# Patient Record
Sex: FEMALE | Race: WHITE | Employment: OTHER | URBAN - METROPOLITAN AREA
[De-identification: names, ages, dates, MRNs, and addresses within clinical notes are randomized per-mention and may not be internally consistent; named-entity substitution may affect disease eponyms.]

---

## 2017-07-31 ENCOUNTER — OFFICE VISIT (OUTPATIENT)
Dept: NEUROLOGY | Age: 82
End: 2017-07-31

## 2017-07-31 VITALS
BODY MASS INDEX: 21.46 KG/M2 | HEART RATE: 53 BPM | RESPIRATION RATE: 18 BRPM | OXYGEN SATURATION: 96 % | SYSTOLIC BLOOD PRESSURE: 130 MMHG | HEIGHT: 62 IN | WEIGHT: 116.6 LBS | DIASTOLIC BLOOD PRESSURE: 84 MMHG

## 2017-07-31 DIAGNOSIS — R27.0 ATAXIA: Primary | ICD-10-CM

## 2017-07-31 RX ORDER — LORAZEPAM 1 MG/1
TABLET ORAL
Refills: 2 | COMMUNITY
Start: 2017-06-01

## 2017-07-31 RX ORDER — CYCLOSPORINE 0.5 MG/ML
1 EMULSION OPHTHALMIC 2 TIMES DAILY
COMMUNITY

## 2017-07-31 RX ORDER — HYDROCHLOROTHIAZIDE 25 MG/1
TABLET ORAL
Refills: 2 | COMMUNITY
Start: 2017-05-17

## 2017-07-31 RX ORDER — OXYBUTYNIN CHLORIDE 10 MG/1
TABLET, EXTENDED RELEASE ORAL
Refills: 3 | COMMUNITY
Start: 2017-07-25

## 2017-07-31 RX ORDER — VENLAFAXINE HYDROCHLORIDE 75 MG/1
CAPSULE, EXTENDED RELEASE ORAL
Refills: 2 | COMMUNITY
Start: 2017-07-10

## 2017-07-31 NOTE — MR AVS SNAPSHOT
Visit Information Date & Time Provider Department Dept. Phone Encounter #  
 7/31/2017 11:00 AM Sheba Kendall MD [facility] at [address] [id number] Follow-up Instructions Return in about 4 weeks (around 8/28/2017). Upcoming Health Maintenance Date Due DTaP/Tdap/Td series (1 - Tdap) 9/9/1955 ZOSTER VACCINE AGE 60> 7/9/1994 GLAUCOMA SCREENING Q2Y 9/9/1999 OSTEOPOROSIS SCREENING (DEXA) 9/9/1999 Pneumococcal 65+ Low/Medium Risk (1 of 2 - PCV13) 9/9/1999 MEDICARE YEARLY EXAM 9/9/1999 INFLUENZA AGE 9 TO ADULT 8/1/2017 Allergies as of 7/31/2017  Review Complete On: 7/31/2017 By: Sheba Kendall MD  
  
 Severity Noted Reaction Type Reactions Erythromycin  07/31/2017    Other (comments) Stomach cramps Current Immunizations  Never Reviewed No immunizations on file. Not reviewed this visit You Were Diagnosed With   
  
 Codes Comments Ataxia    -  Primary ICD-10-CM: R27.0 ICD-9-CM: 919. 3 Vitals BP Pulse Resp Height(growth percentile) Weight(growth percentile) SpO2  
 130/84 (!) 53 18 5' 2\" (1.575 m) 116 lb 9.6 oz (52.9 kg) 96% BMI Smoking Status 21.33 kg/m2 Former Smoker Vitals History BMI and BSA Data Body Mass Index Body Surface Area  
 21.33 kg/m 2 1.52 m 2 Your Updated Medication List  
  
   
This list is accurate as of: 7/31/17 12:09 PM.  Always use your most recent med list.  
  
  
  
  
 hydroCHLOROthiazide 25 mg tablet Commonly known as:  HYDRODIURIL TK 1 T PO D  
  
 LORazepam 1 mg tablet Commonly known as:  ATIVAN  
TK 2 TS PO QHS  
  
 oxybutynin chloride XL 10 mg CR tablet Commonly known as:  DITROPAN XL  
TK 1 T PO QD  
  
 RESTASIS 0.05 % ophthalmic emulsion Generic drug:  cycloSPORINE Administer 1 Drop to both eyes two (2) times a day. venlafaxine-SR 75 mg capsule Commonly known as:  EFFEXOR-XR TK 1 C PO D  
  
  
 We Performed the Following CBC WITH AUTOMATED DIFF [97710 CPT(R)] METABOLIC PANEL, COMPREHENSIVE [72052 CPT(R)] METHYLMALONIC ACID [63147 CPT(R)] REFERRAL TO PHYSICAL THERAPY [JLT50 Custom] Comments:  
 Please evaluate and tx patient for ataxic gait with h/o falls TSH 3RD GENERATION [10973 CPT(R)] VITAMIN B12 [id number] CPT(R)] VITAMIN E [id number] CPT(R)] Follow-up Instructions Return in about 4 weeks (around 8/28/2017). To-Do List   
 08/07/2017 Imaging:  MRI BRAIN WO CONT Referral Information Referral ID Referred By Referred To  
  
 [id number] Nguyen Moody Not Available Visits Status Start Date End Date 1 New Request 7/31/17 7/31/18 If your referral has a status of pending review or denied, additional information will be sent to support the outcome of this decision. Patient Instructions PRESCRIPTION REFILL POLICY [facility] Statement to Patients April 1, 2014 In an effort to ensure the large volume of patient prescription refills is processed in the most efficient and expeditious manner, we are asking our patients to assist us by calling your Pharmacy for all prescription refills, this will include also your  Mail Order Pharmacy. The pharmacy will contact our office electronically to continue the refill process. Please do not wait until the last minute to call your pharmacy. We need at least 48 hours (2days) to fill prescriptions. We also encourage you to call your pharmacy before going to  your prescription to make sure it is ready. With regard to controlled substance prescription refill requests (narcotic refills) that need to be picked up at our office, we ask your cooperation by providing us with at least 72 hours (3days) notice that you will need a refill.  
 
We will not refill narcotic prescription refill requests after 4:00pm on any weekday, Monday through Thursday, or after 2:00pm on Fridays, or on the weekends. We encourage everyone to explore another way of getting your prescription refill request processed using [employer], our patient web portal through our electronic medical record system. [employer] is an efficient and effective way to communicate your medication request directly to the office and  downloadable as an stephan on your smart phone . [employer] also features a review functionality that allows you to view your medication list as well as leave messages for your physician. Are you ready to get connected? If so please review the attatched instructions or speak to any of our staff to get you set up right away! Thank you so much for your cooperation. Should you have any questions please contact our Practice Administrator. The Physicians and Staff,  [facility] Please bring all medication bottles, including vitamins, supplements and any over-the-counter medications, to your next office visit. Introducing [facility]! [facility] introduces [employer] patient portal. Now you can access parts of your medical record, email your doctor's office, and request medication refills online. 1. In your internet browser, go to [email or web address] 2. Click on the First Time User? Click Here link in the Sign In box. You will see the New Member Sign Up page. 3. Enter your [employer] Access Code exactly as it appears below. You will not need to use this code after youve completed the sign-up process. If you do not sign up before the expiration date, you must request a new code. · [employer] Access Code: [email or web address] Expires: 10/29/2017 10:26 AM 
 
4. Enter the last four digits of your Social Security Number (xxxx) and Date of Birth (mm/dd/yyyy) as indicated and click Submit. You will be taken to the next sign-up page. 5. Create a [employer] ID. This will be your [employer] login ID and cannot be changed, so think of one that is secure and easy to remember. 6. Create a [employer] password. You can change your password at any time. 7. Enter your Password Reset Question and Answer. This can be used at a later time if you forget your password. 8. Enter your e-mail address. You will receive e-mail notification when new information is available in [address] 9. Click Sign Up. You can now view and download portions of your medical record. 10. Click the Download Summary menu link to download a portable copy of your medical information. If you have questions, please visit the Frequently Asked Questions section of the [employer] website. Remember, [employer] is NOT to be used for urgent needs. For medical emergencies, dial 911. Now available from your iPhone and Android! Please provide this summary of care documentation to your next provider. Your primary care clinician is listed as Henning Sergio. If you have any questions after today's visit, please call [phone].

## 2017-07-31 NOTE — PATIENT INSTRUCTIONS
[facility]   Statement to Patients  April 1, 2014      In an effort to ensure the large volume of patient prescription refills is processed in the most efficient and expeditious manner, we are asking our patients to assist us by calling your Pharmacy for all prescription refills, this will include also your  Mail Order Pharmacy. The pharmacy will contact our office electronically to continue the refill process. Please do not wait until the last minute to call your pharmacy. We need at least 48 hours (2days) to fill prescriptions. We also encourage you to call your pharmacy before going to  your prescription to make sure it is ready. With regard to controlled substance prescription refill requests (narcotic refills) that need to be picked up at our office, we ask your cooperation by providing us with at least 72 hours (3days) notice that you will need a refill. We will not refill narcotic prescription refill requests after 4:00pm on any weekday, Monday through Thursday, or after 2:00pm on Fridays, or on the weekends. We encourage everyone to explore another way of getting your prescription refill request processed using [employer], our patient web portal through our electronic medical record system. [employer] is an efficient and effective way to communicate your medication request directly to the office and  downloadable as an stephan on your smart phone . [employer] also features a review functionality that allows you to view your medication list as well as leave messages for your physician. Are you ready to get connected? If so please review the attatched instructions or speak to any of our staff to get you set up right away! Thank you so much for your cooperation. Should you have any questions please contact our Practice Administrator.     The Physicians and Staff,  [facility]           Please bring all medication bottles, including vitamins, supplements and any over-the-counter medications, to your next office visit.
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
## 2017-08-03 LAB
A-TOCOPHEROL VIT E SERPL-MCNC: 14.5 MG/L (ref 6.5–21.5)
ALBUMIN SERPL-MCNC: 4.3 G/DL (ref 3.5–4.7)
ALBUMIN/GLOB SERPL: 1.7 {RATIO} (ref 1.2–2.2)
ALP SERPL-CCNC: 74 IU/L (ref 39–117)
ALT SERPL-CCNC: 27 IU/L (ref 0–32)
AST SERPL-CCNC: 34 IU/L (ref 0–40)
BASOPHILS # BLD AUTO: 0 X10E3/UL (ref 0–0.2)
BASOPHILS NFR BLD AUTO: 0 %
BILIRUB SERPL-MCNC: 0.6 MG/DL (ref 0–1.2)
BUN SERPL-MCNC: 18 MG/DL (ref 8–27)
BUN/CREAT SERPL: 21 (ref 12–28)
CALCIUM SERPL-MCNC: 10.1 MG/DL (ref 8.7–10.3)
CHLORIDE SERPL-SCNC: 94 MMOL/L (ref 96–106)
CO2 SERPL-SCNC: 29 MMOL/L (ref 18–29)
CREAT SERPL-MCNC: 0.84 MG/DL (ref 0.57–1)
EOSINOPHIL # BLD AUTO: 0.1 X10E3/UL (ref 0–0.4)
EOSINOPHIL NFR BLD AUTO: 1 %
ERYTHROCYTE [DISTWIDTH] IN BLOOD BY AUTOMATED COUNT: 13.7 % (ref 12.3–15.4)
GLOBULIN SER CALC-MCNC: 2.5 G/DL (ref 1.5–4.5)
GLUCOSE SERPL-MCNC: 95 MG/DL (ref 65–99)
HCT VFR BLD AUTO: 40.8 % (ref 34–46.6)
HGB BLD-MCNC: 13.5 G/DL (ref 11.1–15.9)
IMM GRANULOCYTES # BLD: 0 X10E3/UL (ref 0–0.1)
IMM GRANULOCYTES NFR BLD: 0 %
LYMPHOCYTES # BLD AUTO: 1.2 X10E3/UL (ref 0.7–3.1)
LYMPHOCYTES NFR BLD AUTO: 17 %
MCH RBC QN AUTO: 30 PG (ref 26.6–33)
MCHC RBC AUTO-ENTMCNC: 33.1 G/DL (ref 31.5–35.7)
MCV RBC AUTO: 91 FL (ref 79–97)
METHYLMALONATE SERPL-SCNC: 269 NMOL/L (ref 0–378)
MONOCYTES # BLD AUTO: 0.5 X10E3/UL (ref 0.1–0.9)
MONOCYTES NFR BLD AUTO: 8 %
NEUTROPHILS # BLD AUTO: 5.1 X10E3/UL (ref 1.4–7)
NEUTROPHILS NFR BLD AUTO: 74 %
PLATELET # BLD AUTO: 292 X10E3/UL (ref 150–379)
POTASSIUM SERPL-SCNC: 3.8 MMOL/L (ref 3.5–5.2)
PROT SERPL-MCNC: 6.8 G/DL (ref 6–8.5)
RBC # BLD AUTO: 4.5 X10E6/UL (ref 3.77–5.28)
SODIUM SERPL-SCNC: 140 MMOL/L (ref 134–144)
TSH SERPL DL<=0.005 MIU/L-ACNC: 2.99 UIU/ML (ref 0.45–4.5)
VIT B12 SERPL-MCNC: 638 PG/ML (ref 211–946)
WBC # BLD AUTO: 6.9 X10E3/UL (ref 3.4–10.8)

## 2017-08-07 ENCOUNTER — TELEPHONE (OUTPATIENT)
Dept: NEUROLOGY | Age: 82
End: 2017-08-07

## 2017-08-07 ENCOUNTER — HOSPITAL ENCOUNTER (OUTPATIENT)
Dept: MRI IMAGING | Age: 82
Discharge: HOME | End: 2017-08-07
Attending: PSYCHIATRY & NEUROLOGY
Payer: MEDICARE

## 2017-08-07 DIAGNOSIS — H93.8X1 MASS OF RIGHT EAR CANAL: Primary | ICD-10-CM

## 2017-08-07 DIAGNOSIS — R27.0 ATAXIA: ICD-10-CM

## 2017-08-07 PROCEDURE — 70551 MRI BRAIN STEM W/O DYE: CPT

## 2017-08-07 NOTE — TELEPHONE ENCOUNTER
Spoke with patient's , Lanie Gandhi and informed him that the patient care team would be contacting them to schedule the MRI that Dr. Glenna Paz ordered. Provided him with the patient care team contact number as well.

## 2017-08-08 ENCOUNTER — TELEPHONE (OUTPATIENT)
Dept: NEUROLOGY | Age: 82
End: 2017-08-08

## 2017-08-08 NOTE — TELEPHONE ENCOUNTER
Spoke with Tana Miramontes and informed her that Dr. Isaiah Brothers has received the MRI report and ordered additional tests as recommended.

## 2017-08-14 ENCOUNTER — HOSPITAL ENCOUNTER (OUTPATIENT)
Dept: MRI IMAGING | Age: 82
Discharge: HOME | End: 2017-08-14
Attending: PSYCHIATRY & NEUROLOGY
Payer: MEDICARE

## 2017-08-14 VITALS — BODY MASS INDEX: 20.85 KG/M2 | WEIGHT: 114 LBS

## 2017-08-14 DIAGNOSIS — H93.8X1 MASS OF RIGHT EAR CANAL: ICD-10-CM

## 2017-08-14 PROCEDURE — A9577 INJ MULTIHANCE: HCPCS | Performed by: PSYCHIATRY & NEUROLOGY

## 2017-08-14 PROCEDURE — 70552 MRI BRAIN STEM W/DYE: CPT

## 2017-08-14 PROCEDURE — 74011250636 HC RX REV CODE- 250/636: Performed by: PSYCHIATRY & NEUROLOGY

## 2017-08-14 RX ADMIN — GADOBENATE DIMEGLUMINE 10 ML: 529 INJECTION, SOLUTION INTRAVENOUS at 13:24

## 2017-08-15 ENCOUNTER — TELEPHONE (OUTPATIENT)
Dept: NEUROLOGY | Age: 82
End: 2017-08-15

## 2017-08-15 NOTE — TELEPHONE ENCOUNTER
Spoke with patient. Informed her that, per Dr. Pretty Alford, MRI brain and IAC looks great! The area they were concerned about turned out to be a normal blood vessel. We can review together at f/u appt. Patient was given an opportunity to ask questions, repeated information, and verbalized understanding.

## 2017-08-15 NOTE — TELEPHONE ENCOUNTER
Pt calling to check on the results of her MRI that she had yesterday. Pt requesting a returned phone call.

## 2017-08-15 NOTE — TELEPHONE ENCOUNTER
Larissa - Please call pt: MRI brain and IAC looks great! The area they were concerned about turned out to be a normal blood vessel. We can review together at f/u appt.

## 2017-08-31 ENCOUNTER — OFFICE VISIT (OUTPATIENT)
Dept: NEUROLOGY | Age: 82
End: 2017-08-31

## 2017-08-31 VITALS
DIASTOLIC BLOOD PRESSURE: 70 MMHG | HEART RATE: 63 BPM | OXYGEN SATURATION: 96 % | SYSTOLIC BLOOD PRESSURE: 120 MMHG | HEIGHT: 62 IN | BODY MASS INDEX: 21.42 KG/M2 | WEIGHT: 116.4 LBS | RESPIRATION RATE: 18 BRPM

## 2017-08-31 DIAGNOSIS — H81.10 BPPV (BENIGN PAROXYSMAL POSITIONAL VERTIGO), UNSPECIFIED LATERALITY: Primary | ICD-10-CM

## 2017-08-31 NOTE — PROGRESS NOTES
Neurology Consult Note      HISTORY PROVIDED BY: patient and spouse    Chief Complaint:   Chief Complaint   Patient presents with    Gait Problem     f/u    Dizziness     f/u      Subjective:   Pt is an 80 y.o. right handed female initially and last seen in clinic 7/31/17 with imbalance gradually progressing, but significantly worse this summer, \"walking like a drunk\", associated with falls, c/o head feeling full. Exam with bradycardia, absent position sense in great toes, absent vibratory sensation in right great toe only, wide based ataxic gait, and unable to tandem walk. Patient has loss of proprioception on her examination which certainly could be contributing to her gait disturbance, in addition to BPPV and suspected contribution from vestibular dysfunction. Central etiology could not be excluded, reported h/o urinary incontinence and stroke risk factor of hypertension. Additionally, has probable mild memory loss. Recommended MRI brain to evaluate for stroke, mass, or NPH. Ordered labs including CMP, CBC with differential, TSH, B12/MMA, Vit E, and TSH to evaluate for metabolic etiology for loss of proprioception and imbalance. No evidence on exam for myelopathy or radiculopathy. Referred to physical therapy for gait training and fall prevention. She returns for f/u. Labs were all normal.  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 Moderate CIWM changes, no acute strokes, no ventriculomegaly. Pt reports she is back to normal.  Reports h/o BPPV 20 years ago.   Saw Dr. Matthew Lomas in ENT, last week and had a repositioning maneuver and started on prednisone. No new complaints. Past Medical History:   Diagnosis Date    Arthritis     Bladder incontinence     Hypertension     Vertigo       Past Surgical History:   Procedure Laterality Date    HX CYST REMOVAL  2012    on epiglottis    HX TUMOR REMOVAL Left     left middle ear begnin tumor removed      Social History     Social History    Marital status:      Spouse name: N/A    Number of children: N/A    Years of education: N/A     Occupational History    Did not work outside home      Social History Main Topics    Smoking status: Former Smoker     Quit date: 1981    Smokeless tobacco: Never Used    Alcohol use 4.2 - 8.4 oz/week     7 - 14 Standard drinks or equivalent per week    Drug use: No    Sexual activity: Not on file     Other Topics Concern    Not on file     Social History Narrative    , lives In [place], [place]     Family History   Problem Relation Age of Onset    Dementia Mother      Onset in late 66's, [place] 65XC    Stroke Father      Dec 72yo    Hypertension Father     Other Sister      Dec age 2yo    Other Brother      AAA rupture    Seizures Grandchild          Objective:   Review of Systems : Per HPI, o/w neg      Allergies   Allergen Reactions    Erythromycin Other (comments)     Stomach cramps          Meds:  Outpatient Medications Prior to Visit   Medication Sig Dispense Refill    venlafaxine-SR (EFFEXOR-XR) 75 mg capsule TK 1 C PO D  2    oxybutynin chloride XL (DITROPAN XL) 10 mg CR tablet TK 1 T PO QD  3    LORazepam (ATIVAN) 1 mg tablet TK 2 TS PO QHS  2    hydroCHLOROthiazide (HYDRODIURIL) 25 mg tablet TK 1 T PO D  2    cycloSPORINE (RESTASIS) 0.05 % ophthalmic emulsion Administer 1 Drop to both eyes two (2) times a day. No facility-administered medications prior to visit.         Imaging:  MRI Results (most recent):    Results from Hospital Encounter encounter on 08/14/17   MRI BRAIN W CONT   Narrative EXAM:  MRI BRAIN W CONT  INDICATION: MRI brain special attention to IACs -mass seen on previous MRI  brain without contrast. Patient with ataxia and vertigo. TECHNIQUE:  Pre and post intravenous infusion 10 mL MultiHance gadolinium whole head T1  weighted images as well as whole head post coronal T1-weighted images were  obtained. Thin 2 mm T1-weighted images were obtained through the internal  auditory canal as well as T2 cisternogram. Following contrast thin 2 mm axial  and coronal 2 mm fat-sat T1-weighted images were obtained centered on the  internal auditory canals. COMPARISON: MRI of the head without contrast 8/7/17. FINDINGS:  The soft tissue fullness in the right temporal bone adjacent to the internal  auditory canal has imaging characteristics after contrast and then imaging of a  prominent high riding jugular bulb. No abnormal enhancement or mass in the CP angle internal auditory canals or  temporal bones. Other visualized structures of the skull base are unremarkable. There is no abnormal intracranial enhancement on the axial and coronal whole  head T1 weighted images. Impression IMPRESSION:  1. The patient has prominent high riding jugular bulbs simulating petrous  temporal bone masses near the internal auditory canals. This is a normal variant  confirmed with contrast and then images. 2. No abnormal intracranial enhancement or other acute abnormality demonstrated       CT Results (most recent):  No results found for this or any previous visit.      Reviewed records in [employer] [facility] [employer] tab today    Lab Review   Results for orders placed or performed in visit on 07/31/17   VITAMIN B12   Result Value Ref Range    Vitamin B12 638 211 - 946 pg/mL   METHYLMALONIC ACID   Result Value Ref Range    METHYLMALONIC ACID, SERUM 269 0 - 378 nmol/L   VITAMIN E   Result Value Ref Range    Vitamin E (Alpha Tocopherol) 14.5 6.5 - 70.5 mg/L   METABOLIC PANEL, COMPREHENSIVE   Result Value Ref Range    Glucose 95 65 - 99 mg/dL    BUN 18 8 - 27 mg/dL    Creatinine 0.84 0.57 - 1.00 mg/dL    GFR est non-AA 65 >59 mL/min/1.73    GFR est AA 75 >59 mL/min/1.73    BUN/Creatinine ratio 21 12 - 28    Sodium 140 134 - 144 mmol/L    Potassium 3.8 3.5 - 5.2 mmol/L    Chloride 94 (L) 96 - 106 mmol/L    CO2 29 18 - 29 mmol/L    Calcium 10.1 8.7 - 10.3 mg/dL    Protein, total 6.8 6.0 - 8.5 g/dL    Albumin 4.3 3.5 - 4.7 g/dL    GLOBULIN, TOTAL 2.5 1.5 - 4.5 g/dL    A-G Ratio 1.7 1.2 - 2.2    Bilirubin, total 0.6 0.0 - 1.2 mg/dL    Alk. phosphatase 74 39 - 117 IU/L    AST (SGOT) 34 0 - 40 IU/L    ALT (SGPT) 27 0 - 32 IU/L   CBC WITH AUTOMATED DIFF   Result Value Ref Range    WBC 6.9 3.4 - 10.8 x10E3/uL    RBC 4.50 3.77 - 5.28 x10E6/uL    HGB 13.5 11.1 - 15.9 g/dL    HCT 40.8 34.0 - 46.6 %    MCV 91 79 - 97 fL    MCH 30.0 26.6 - 33.0 pg    MCHC 33.1 31.5 - 35.7 g/dL    RDW 13.7 12.3 - 15.4 %    PLATELET 828 950 - 512 x10E3/uL    NEUTROPHILS 74 %    Lymphocytes 17 %    MONOCYTES 8 %    EOSINOPHILS 1 %    BASOPHILS 0 %    ABS. NEUTROPHILS 5.1 1.4 - 7.0 x10E3/uL    Abs Lymphocytes 1.2 0.7 - 3.1 x10E3/uL    ABS. MONOCYTES 0.5 0.1 - 0.9 x10E3/uL    ABS. EOSINOPHILS 0.1 0.0 - 0.4 x10E3/uL    ABS. BASOPHILS 0.0 0.0 - 0.2 x10E3/uL    IMMATURE GRANULOCYTES 0 %    ABS. IMM. GRANS. 0.0 0.0 - 0.1 x10E3/uL   TSH 3RD GENERATION   Result Value Ref Range    TSH 2.990 0.450 - 4.500 uIU/mL        Exam:  Visit Vitals    /70    Pulse 63    Resp 18    Ht 5' 2\" (1.575 m)    Wt 52.8 kg (116 lb 6.4 oz)    SpO2 96%    BMI 21.29 kg/m2     General:  Alert, cooperative, no distress. Head:  Normocephalic, without obvious abnormality, atraumatic. Respiratory:  Heart:   Non labored breathing  Regular rate and rhythm   Neck:      Extremities: Warm, no cyanosis or edema. Pulses: 2+ radial pulses. Neurologic:  MS: Alert and oriented x 4, speech intact. Language intact. Attention and fund of knowledge appropriate. Recent and remote memory intact.   Cranial Nerves:  II: visual fields    II: pupils    II: optic disc    III,VII: ptosis none   III,IV,VI: extraocular muscles  EOMI, no nystagmus or diplopia   V: facial light touch sensation     VII: facial muscle function   symmetric   VIII: hearing Hearing aids in place   IX: soft palate elevation     XI: trapezius strength     XI: sternocleidomastoid strength    XII: tongue       Motor: normal bulk and tone, no tremor, Strength: 5/5 throughout, no PD  Sensory: (At 7/31/17 visit: intact to LT, PP, absent position sense bilaterally, dec vibratory sensation in left great toe, absent on right.)  Coordination: FTN and HTS intact  Gait: steady routine gait, able to tandem walk with minimal difficulty. Reflexes:           Assessment/Plan   Pt is an 80 y.o. right handed female with imbalance gradually progressing, but significantly worse this summer, \"walking like a drunk\", associated with falls, c/o head feeling full with h/o BPPV and found on exam at initial visit to have loss of proprioception in bilateral great toes. MRI brain w/wo contrast without structural etiology. Symptoms are now resolved after repositioning maneuver for BPPV and prednisone. Exam is improved with stable gait, able to tandem walk with minimal difficulty. Continue to f/u with ENT. F/u in neurology as needed. ICD-10-CM ICD-9-CM    1. BPPV (benign paroxysmal positional vertigo), unspecified laterality H81.10 386.11        Signed:   John Ly MD  8/31/2017

## 2017-08-31 NOTE — PATIENT INSTRUCTIONS
[facility]   Statement to Patients  April 1, 2014      In an effort to ensure the large volume of patient prescription refills is processed in the most efficient and expeditious manner, we are asking our patients to assist us by calling your Pharmacy for all prescription refills, this will include also your  Mail Order Pharmacy. The pharmacy will contact our office electronically to continue the refill process. Please do not wait until the last minute to call your pharmacy. We need at least 48 hours (2days) to fill prescriptions. We also encourage you to call your pharmacy before going to  your prescription to make sure it is ready. With regard to controlled substance prescription refill requests (narcotic refills) that need to be picked up at our office, we ask your cooperation by providing us with at least 72 hours (3days) notice that you will need a refill. We will not refill narcotic prescription refill requests after 4:00pm on any weekday, Monday through Thursday, or after 2:00pm on Fridays, or on the weekends. We encourage everyone to explore another way of getting your prescription refill request processed using [employer], our patient web portal through our electronic medical record system. [employer] is an efficient and effective way to communicate your medication request directly to the office and  downloadable as an stephan on your smart phone . [employer] also features a review functionality that allows you to view your medication list as well as leave messages for your physician. Are you ready to get connected? If so please review the attatched instructions or speak to any of our staff to get you set up right away! Thank you so much for your cooperation. Should you have any questions please contact our Practice Administrator.     The Physicians and Staff,  [facility]           Please bring all medication bottles, including vitamins, supplements and any over-the-counter medications, to your next office visit.

## 2017-08-31 NOTE — PROGRESS NOTES
Patient here for follow up on ataxia. She did not start PT yet because she wanted to follow up with Dr. Rema Maria first. She also stated she saw her ENT, Dr. Louann Alford, since her last office visit. No falls since last office visit.

## 2017-10-25 ENCOUNTER — TELEPHONE (OUTPATIENT)
Dept: NEUROLOGY | Age: 82
End: 2017-10-25

## 2017-10-25 NOTE — TELEPHONE ENCOUNTER
----- Message from Dena Cordoba sent at 10/25/2017  2:07 PM EDT -----  Regarding: Dr. Seven Carlton  Pt needs the last office visit notes to be faxed to her PCP Dr. Hilda Ellison at [phone]. Her contact number is [phone].